# Patient Record
Sex: MALE | Race: WHITE | Employment: FULL TIME | URBAN - METROPOLITAN AREA
[De-identification: names, ages, dates, MRNs, and addresses within clinical notes are randomized per-mention and may not be internally consistent; named-entity substitution may affect disease eponyms.]

---

## 2017-09-08 ENCOUNTER — OFFICE VISIT (OUTPATIENT)
Dept: FAMILY MEDICINE CLINIC | Facility: CLINIC | Age: 27
End: 2017-09-08

## 2017-09-08 VITALS
HEART RATE: 92 BPM | WEIGHT: 200 LBS | HEIGHT: 67 IN | BODY MASS INDEX: 31.39 KG/M2 | DIASTOLIC BLOOD PRESSURE: 74 MMHG | OXYGEN SATURATION: 98 % | SYSTOLIC BLOOD PRESSURE: 126 MMHG | TEMPERATURE: 99 F

## 2017-09-08 DIAGNOSIS — J06.9 VIRAL UPPER RESPIRATORY TRACT INFECTION: Primary | ICD-10-CM

## 2017-09-08 PROCEDURE — 99213 OFFICE O/P EST LOW 20 MIN: CPT | Performed by: NURSE PRACTITIONER

## 2017-09-08 NOTE — PROGRESS NOTES
CHIEF COMPLAINT:   Patient presents with:  Cough: runny nose, cough is with phlegm and dry x 2 dy s      HPI:   Ne Roque is a 33year old male who presents for ill symptoms for  2 days.  Patient reports congestion, clear colored nasal discha ASSESSMENT AND PLAN:   Anjum Isabel is a 33year old male who presents with     Kyra Ink was seen today for cough.     Diagnoses and all orders for this visit:    Viral upper respiratory tract infection     Risks, benefits, and side effects of · Self-care. This includes extra rest, using humidifiers, and drinking more fluids. These help you feel better while you are getting over a cold. Antibiotics are not helpful for a cold. They do not make a cold shorter or relieve symptoms.  Taking antibioti © 4042-0535 The [address] [place] [address], [address]. All rights reserved. This information is not intended as a substitute for professional medical care. Always follow your healthcare professional's instructions.             The

## 2017-09-08 NOTE — PATIENT INSTRUCTIONS
Start Claritin or other over the counter allergy medication. Start Saline rinse nasal sprays. Tylenol as needed for pain or fevers. Understanding the Cold Virus  Colds are the most common illness that people get.  Most adults get 2 or 3 colds per year, Contact your healthcare provider if you have any questions about using cold medicines safely. Can a cold be prevented? You can help reduce the spread of cold viruses. This can help both you and others avoid getting colds.  Follow these tips:  · Wash your